# Patient Record
(demographics unavailable — no encounter records)

---

## 2024-12-13 NOTE — HISTORY OF PRESENT ILLNESS
[FreeTextEntry1] : Mohs surgery for BCC of the R inferior medial malar cheek [de-identified] : 12/12/2024   Referred by: Dr. Chavez   We had the pleasure of seeing your patient in consultation for Mohs Micrographic Surgery. Ms. ELIUD RENEE is a 80 year old F who presents for R inferior medial malar cheek. It was present like a pimple for many months and growing slowly.  Present for months, asx   SH: She had 4 sons, 1 passed, her daughter passed  Pertinent positives noted below History of HIV or hepatitis: No Blood thinners: ASA 81 mg daily, d/x 4 days ago.  Antibiotic Prophylaxis: None Medical implants: Pacemaker 2019   The patient's review of systems questionnaire was reviewed. Education needs were identified. There were no barriers to learning.

## 2024-12-13 NOTE — HISTORY OF PRESENT ILLNESS
[FreeTextEntry1] : Mohs surgery for BCC of the R inferior medial malar cheek [de-identified] : 12/12/2024   Referred by: Dr. Chavez   We had the pleasure of seeing your patient in consultation for Mohs Micrographic Surgery. Ms. ELIUD RENEE is a 80 year old F who presents for R inferior medial malar cheek. It was present like a pimple for many months and growing slowly.  Present for months, asx   SH: She had 4 sons, 1 passed, her daughter passed  Pertinent positives noted below History of HIV or hepatitis: No Blood thinners: ASA 81 mg daily, d/x 4 days ago.  Antibiotic Prophylaxis: None Medical implants: Pacemaker 2019   The patient's review of systems questionnaire was reviewed. Education needs were identified. There were no barriers to learning.

## 2024-12-13 NOTE — HISTORY OF PRESENT ILLNESS
[FreeTextEntry1] : Mohs surgery for BCC of the R inferior medial malar cheek [de-identified] : 12/12/2024   Referred by: Dr. Chavez   We had the pleasure of seeing your patient in consultation for Mohs Micrographic Surgery. Ms. ELIUD RENEE is a 80 year old F who presents for R inferior medial malar cheek. It was present like a pimple for many months and growing slowly.  Present for months, asx   SH: She had 4 sons, 1 passed, her daughter passed  Pertinent positives noted below History of HIV or hepatitis: No Blood thinners: ASA 81 mg daily, d/x 4 days ago.  Antibiotic Prophylaxis: None Medical implants: Pacemaker 2019   The patient's review of systems questionnaire was reviewed. Education needs were identified. There were no barriers to learning.

## 2024-12-13 NOTE — PHYSICAL EXAM
[Alert] : alert [Oriented x 3] : ~L oriented x 3 [Well Nourished] : well nourished [Conjunctiva Non-injected] : conjunctiva non-injected [No Visual Lymphadenopathy] : no visual  lymphadenopathy [No Clubbing] : no clubbing [No Edema] : no edema [No Bromhidrosis] : no bromhidrosis [No Chromhidrosis] : no chromhidrosis [FreeTextEntry3] : R inferior medial malar cheek - 1.1x1.0 cm pink thin plaque